# Patient Record
Sex: MALE | Race: WHITE | NOT HISPANIC OR LATINO
[De-identification: names, ages, dates, MRNs, and addresses within clinical notes are randomized per-mention and may not be internally consistent; named-entity substitution may affect disease eponyms.]

---

## 2019-01-01 ENCOUNTER — APPOINTMENT (OUTPATIENT)
Dept: CARDIOLOGY | Facility: CLINIC | Age: 69
End: 2019-01-01
Payer: MEDICARE

## 2019-01-01 ENCOUNTER — NON-APPOINTMENT (OUTPATIENT)
Age: 69
End: 2019-01-01

## 2019-01-01 VITALS
DIASTOLIC BLOOD PRESSURE: 68 MMHG | HEART RATE: 110 BPM | SYSTOLIC BLOOD PRESSURE: 124 MMHG | OXYGEN SATURATION: 98 % | RESPIRATION RATE: 16 BRPM

## 2019-01-01 VITALS — SYSTOLIC BLOOD PRESSURE: 128 MMHG | DIASTOLIC BLOOD PRESSURE: 70 MMHG

## 2019-01-01 VITALS — WEIGHT: 189 LBS | HEIGHT: 69 IN | BODY MASS INDEX: 27.99 KG/M2

## 2019-01-01 DIAGNOSIS — E31.20 MULTIPLE ENDOCRINE NEOPLASIA [MEN] SYNDROME, UNSPECIFIED: ICD-10-CM

## 2019-01-01 DIAGNOSIS — Z83.3 FAMILY HISTORY OF DIABETES MELLITUS: ICD-10-CM

## 2019-01-01 DIAGNOSIS — Z86.39 PERSONAL HISTORY OF OTHER ENDOCRINE, NUTRITIONAL AND METABOLIC DISEASE: ICD-10-CM

## 2019-01-01 DIAGNOSIS — Z01.810 ENCOUNTER FOR PREPROCEDURAL CARDIOVASCULAR EXAMINATION: ICD-10-CM

## 2019-01-01 DIAGNOSIS — Z78.9 OTHER SPECIFIED HEALTH STATUS: ICD-10-CM

## 2019-01-01 DIAGNOSIS — Z87.438 PERSONAL HISTORY OF OTHER DISEASES OF MALE GENITAL ORGANS: ICD-10-CM

## 2019-01-01 DIAGNOSIS — Z87.442 PERSONAL HISTORY OF URINARY CALCULI: ICD-10-CM

## 2019-01-01 DIAGNOSIS — Z98.890 OTHER SPECIFIED POSTPROCEDURAL STATES: ICD-10-CM

## 2019-01-01 DIAGNOSIS — K59.00 CONSTIPATION, UNSPECIFIED: ICD-10-CM

## 2019-01-01 DIAGNOSIS — N42.9 DISORDER OF PROSTATE, UNSPECIFIED: ICD-10-CM

## 2019-01-01 PROCEDURE — 99205 OFFICE O/P NEW HI 60 MIN: CPT | Mod: 25

## 2019-01-01 PROCEDURE — 93000 ELECTROCARDIOGRAM COMPLETE: CPT

## 2019-01-01 PROCEDURE — 93306 TTE W/DOPPLER COMPLETE: CPT

## 2019-01-01 RX ORDER — DOCUSATE SODIUM 100 MG/1
100 CAPSULE, LIQUID FILLED ORAL
Refills: 0 | Status: ACTIVE | COMMUNITY

## 2019-01-01 RX ORDER — TAMSULOSIN HYDROCHLORIDE 0.4 MG/1
0.4 CAPSULE ORAL
Refills: 0 | Status: ACTIVE | COMMUNITY

## 2019-01-01 RX ORDER — UBIDECARENONE/VIT E ACET 100MG-5
1000 CAPSULE ORAL
Refills: 0 | Status: ACTIVE | COMMUNITY

## 2019-11-13 PROBLEM — K59.00 CONSTIPATION: Status: ACTIVE | Noted: 2019-01-01

## 2019-11-13 PROBLEM — E31.20: Status: RESOLVED | Noted: 2019-01-01 | Resolved: 2019-01-01

## 2019-11-13 PROBLEM — Z78.9 SOCIAL ALCOHOL USE: Status: ACTIVE | Noted: 2019-01-01

## 2019-11-13 PROBLEM — Z83.3 FAMILY HISTORY OF DIABETES MELLITUS: Status: ACTIVE | Noted: 2019-01-01

## 2019-11-13 PROBLEM — Z78.9 CAFFEINE USE: Status: ACTIVE | Noted: 2019-01-01

## 2019-11-13 PROBLEM — Z87.438 HISTORY OF BENIGN PROSTATIC HYPERPLASIA: Status: RESOLVED | Noted: 2019-01-01 | Resolved: 2019-01-01

## 2019-11-13 PROBLEM — Z78.9 EXERCISES RARELY: Status: ACTIVE | Noted: 2019-01-01

## 2019-11-13 PROBLEM — Z98.890 H/O COLONOSCOPY: Status: RESOLVED | Noted: 2019-01-01 | Resolved: 2019-01-01

## 2019-11-13 PROBLEM — N42.9 PROSTATE DISEASE: Status: ACTIVE | Noted: 2019-01-01

## 2019-11-13 PROBLEM — Z87.442 HISTORY OF KIDNEY STONES: Status: RESOLVED | Noted: 2019-01-01 | Resolved: 2019-01-01

## 2019-11-13 PROBLEM — Z01.810 PREOPERATIVE CARDIOVASCULAR EXAMINATION: Status: ACTIVE | Noted: 2019-01-01

## 2019-11-13 PROBLEM — Z86.39 HISTORY OF HYPERLIPIDEMIA: Status: RESOLVED | Noted: 2019-01-01 | Resolved: 2019-01-01

## 2019-11-13 NOTE — REASON FOR VISIT
[Initial Evaluation] : an initial evaluation of [FreeTextEntry2] : rigth atrial flutter , and s/p ablation [FreeTextEntry1] : rigth atrial flutter , and s/p ablation

## 2019-11-13 NOTE — HISTORY OF PRESENT ILLNESS
[FreeTextEntry1] : rigth atrial flutter , and s/p ablation\par \par this is a 68 year old male with history of atrial flutter, s/p ablation 2014, here for follow up;. \par \par oct 24th , 2019, went to [facility] with abomdinal pain. they did a CT scan,. The CT scan showed 2 Adrenal masses bilaterally and kidney stones. liver nodules. Ureteral calculus. \par Adrenal mases increased in size compared to aprl 2019 \par \par \par  [Scheduled Procedure ___] : a [unfilled] [Preoperative Visit] : for a medical evaluation prior to surgery [Date of Surgery ___] : on [unfilled] [Surgeon Name ___] : surgeon: [unfilled] [Fever] : no fever [Chills] : no chills [Fatigue] : no fatigue [Chest Pain] : no chest pain [Cough] : no cough [Dyspnea] : no dyspnea [Dysuria] : no dysuria [Nausea] : no nausea [Urinary Frequency] : no urinary frequency [Diarrhea] : no diarrhea [Abdominal Pain] : abdominal pain [Vomiting] : no vomiting [Lower Extremity Swelling] : no lower extremity swelling [Easy Bruising] : no easy bruising [Poor Exercise Tolerance] : no poor exercise tolerance [Diabetes] : no diabetes [Cardiovascular Disease] : no cardiovascular disease [Nicotine Dependence] : no nicotine dependence [Anti-Platelet Agents] : no anti-platelet agents [Pulmonary Disease] : no pulmonary disease [Alcohol Use] : no  alcohol use [Renal Disease] : no renal disease [Thromboembolic Problems] : no thromboembolic problems [GI Disease] : no gastrointestinal disease [Sleep Apnea] : no sleep apnea [Transfusion Reaction] : no transfusion reaction [Steroid Use in Last 6 Months] : no steroid use in the last six months [Impaired Immunity] : no impaired immunity [Frequent Aspirin Use] : no frequent aspirin use [Prior Anesthesia] : Prior anesthesia [Prev Anesthesia Reaction] : no previous anesthesia reaction [Electrocardiogram] : ~T an ECG ~C was performed [Metabolic Capacity ___Mets%] : The patient has a metabolic capacity of [unfilled] Mets%  [Sudden Death] : no sudden death [Good] : Good [Anesthesia Reaction] : no anesthesia reaction [Bleeding Disorder] : no bleeding disorder [Clotting Disorder] : no clotting disorder [de-identified] : periumbilical pain , bloating.  [de-identified] : [facility]

## 2019-11-13 NOTE — PHYSICAL EXAM
[General Appearance - Well Developed] : well developed [Normal Appearance] : normal appearance [Well Groomed] : well groomed [General Appearance - Well Nourished] : well nourished [No Deformities] : no deformities [General Appearance - In No Acute Distress] : no acute distress [Normal Conjunctiva] : the conjunctiva exhibited no abnormalities [Eyelids - No Xanthelasma] : the eyelids demonstrated no xanthelasmas [No Oral Pallor] : no oral pallor [Normal Oral Mucosa] : normal oral mucosa [Normal Jugular Venous A Waves Present] : normal jugular venous A waves present [No Oral Cyanosis] : no oral cyanosis [No Jugular Venous Roldan A Waves] : no jugular venous roldan A waves [Heart Rate And Rhythm] : heart rate and rhythm were normal [Normal Jugular Venous V Waves Present] : normal jugular venous V waves present [Heart Sounds] : normal S1 and S2 [Murmurs] : no murmurs present [Respiration, Rhythm And Depth] : normal respiratory rhythm and effort [Auscultation Breath Sounds / Voice Sounds] : lungs were clear to auscultation bilaterally [Exaggerated Use Of Accessory Muscles For Inspiration] : no accessory muscle use [Abdomen Soft] : soft [Abdomen Tenderness] : non-tender [Abnormal Walk] : normal gait [Abdomen Mass (___ Cm)] : no abdominal mass palpated [Gait - Sufficient For Exercise Testing] : the gait was sufficient for exercise testing [Cyanosis, Localized] : no localized cyanosis [Nail Clubbing] : no clubbing of the fingernails [Petechial Hemorrhages (___cm)] : no petechial hemorrhages [Skin Color & Pigmentation] : normal skin color and pigmentation [] : no rash [No Venous Stasis] : no venous stasis [Skin Lesions] : no skin lesions [No Xanthoma] : no  xanthoma was observed [No Skin Ulcers] : no skin ulcer [Oriented To Time, Place, And Person] : oriented to person, place, and time [Affect] : the affect was normal [Mood] : the mood was normal [No Anxiety] : not feeling anxious

## 2019-11-13 NOTE — DISCUSSION/SUMMARY
[Patient] : the patient [Benefits] : benefits [Risks] : risks [Alternatives] : alternatives [With Me] : with me [___ Month(s)] : [unfilled] month(s) [FreeTextEntry1] : This is a 68 year old male with history of .atrial flutter, s/p ablation, here with Pre-operative cardiovascular risk evaluation and management \par 1)  Pre-operative cardiovascular risk evaluation and management : No cardiac Symptoms. No diagnostic ischemic changes on ECG. METs > 4.  RCRI score < 1%. Michaels perioperative risk score < 1%. NSQIP score < 1%. No further cardiac work up is needed. Proceed for surgery as indicated.\par Any further work up will not change the risk of this patient. Benefits of surgery outweigh the risk. \par 2) Right atrial enlargement: 2D echo with salien contrast bubble study. '\par 3) History of atrial flutter: s/p ablation./ marcos aspirin for surgery.  [Procedure Low Risk] : the procedure risk is low [As per surgery] : as per surgery [Optimized for Surgery] : the patient is optimized for surgery [FreeTextEntry3] : hold aspirin for 5 days, and resumt 2-3 days after procedure.  [Continue] : Continue medications as currently directed

## 2020-01-01 ENCOUNTER — TRANSCRIPTION ENCOUNTER (OUTPATIENT)
Age: 70
End: 2020-01-01

## 2020-01-01 ENCOUNTER — APPOINTMENT (OUTPATIENT)
Dept: CARDIOLOGY | Facility: CLINIC | Age: 70
End: 2020-01-01
Payer: MEDICARE

## 2020-01-01 ENCOUNTER — NON-APPOINTMENT (OUTPATIENT)
Age: 70
End: 2020-01-01

## 2020-01-01 VITALS
DIASTOLIC BLOOD PRESSURE: 62 MMHG | WEIGHT: 188 LBS | SYSTOLIC BLOOD PRESSURE: 105 MMHG | HEART RATE: 78 BPM | BODY MASS INDEX: 27.85 KG/M2 | HEIGHT: 69 IN | OXYGEN SATURATION: 95 %

## 2020-01-01 DIAGNOSIS — I48.0 PAROXYSMAL ATRIAL FIBRILLATION: ICD-10-CM

## 2020-01-01 DIAGNOSIS — R00.2 PALPITATIONS: ICD-10-CM

## 2020-01-01 DIAGNOSIS — R52 PAIN, UNSPECIFIED: ICD-10-CM

## 2020-01-01 DIAGNOSIS — E78.00 PURE HYPERCHOLESTEROLEMIA, UNSPECIFIED: ICD-10-CM

## 2020-01-01 DIAGNOSIS — Z09 ENCOUNTER FOR FOLLOW-UP EXAMINATION AFTER COMPLETED TREATMENT FOR CONDITIONS OTHER THAN MALIGNANT NEOPLASM: ICD-10-CM

## 2020-01-01 DIAGNOSIS — Z86.79 PERSONAL HISTORY OF OTHER DISEASES OF THE CIRCULATORY SYSTEM: ICD-10-CM

## 2020-01-01 PROCEDURE — 99214 OFFICE O/P EST MOD 30 MIN: CPT | Mod: 25

## 2020-01-01 PROCEDURE — 93000 ELECTROCARDIOGRAM COMPLETE: CPT

## 2020-01-01 PROCEDURE — 99215 OFFICE O/P EST HI 40 MIN: CPT | Mod: 95

## 2020-01-01 RX ORDER — SULFAMETHOXAZOLE AND TRIMETHOPRIM 800; 160 MG/1; MG/1
800-160 TABLET ORAL
Refills: 0 | Status: ACTIVE | COMMUNITY
Start: 2020-01-01

## 2020-01-01 RX ORDER — METOPROLOL SUCCINATE 25 MG/1
25 TABLET, EXTENDED RELEASE ORAL DAILY
Qty: 90 | Refills: 1 | Status: ACTIVE | COMMUNITY
Start: 2020-01-01 | End: 1900-01-01

## 2020-01-01 RX ORDER — ACYCLOVIR 200 MG/1
200 CAPSULE ORAL TWICE DAILY
Refills: 0 | Status: DISCONTINUED | COMMUNITY
Start: 2020-01-01 | End: 2020-01-01

## 2020-01-01 RX ORDER — PANTOPRAZOLE SODIUM 40 MG/1
40 TABLET, DELAYED RELEASE ORAL DAILY
Refills: 0 | Status: ACTIVE | COMMUNITY
Start: 2020-01-01

## 2020-01-01 RX ORDER — DILTIAZEM HYDROCHLORIDE 30 MG/1
30 TABLET ORAL 3 TIMES DAILY
Refills: 0 | Status: DISCONTINUED | COMMUNITY
End: 2020-01-01

## 2020-01-01 RX ORDER — OXYCODONE HYDROCHLORIDE 5 MG/1
5 CAPSULE ORAL
Refills: 0 | Status: ACTIVE | COMMUNITY

## 2020-01-01 RX ORDER — ACYCLOVIR 400 MG/1
400 TABLET ORAL TWICE DAILY
Refills: 0 | Status: ACTIVE | COMMUNITY

## 2020-01-01 RX ORDER — DILTIAZEM HYDROCHLORIDE 30 MG/1
30 TABLET ORAL EVERY 6 HOURS
Qty: 360 | Refills: 1 | Status: DISCONTINUED | COMMUNITY
End: 2020-01-01

## 2020-01-01 RX ORDER — SENNOSIDES 8.6 MG TABLETS 8.6 MG/1
8.6 TABLET ORAL DAILY
Refills: 0 | Status: ACTIVE | COMMUNITY

## 2020-01-01 RX ORDER — ATORVASTATIN CALCIUM 20 MG/1
20 TABLET, FILM COATED ORAL DAILY
Refills: 0 | Status: DISCONTINUED | COMMUNITY
End: 2020-01-01

## 2020-01-01 RX ORDER — FAMOTIDINE 20 MG/1
20 TABLET, FILM COATED ORAL
Refills: 0 | Status: ACTIVE | COMMUNITY

## 2020-01-24 PROBLEM — Z09 HOSPITAL DISCHARGE FOLLOW-UP: Status: ACTIVE | Noted: 2020-01-01

## 2020-01-24 NOTE — PHYSICAL EXAM
[Well Groomed] : well groomed [General Appearance - Well Nourished] : well nourished [No Deformities] : no deformities [General Appearance - In No Acute Distress] : no acute distress [Normal Conjunctiva] : the conjunctiva exhibited no abnormalities [Normal Oral Mucosa] : normal oral mucosa [Normal Jugular Venous V Waves Present] : normal jugular venous V waves present [Respiration, Rhythm And Depth] : normal respiratory rhythm and effort [Auscultation Breath Sounds / Voice Sounds] : lungs were clear to auscultation bilaterally [Heart Sounds] : normal S1 and S2 [Edema] : no peripheral edema present [Veins - Varicosity Changes] : no varicosital changes were noted in the lower extremities [Abdomen Soft] : soft [Skin Color & Pigmentation] : normal skin color and pigmentation [] : no rash [Oriented To Time, Place, And Person] : oriented to person, place, and time [No Anxiety] : not feeling anxious

## 2020-01-24 NOTE — PHYSICAL EXAM
[Well Groomed] : well groomed [General Appearance - Well Nourished] : well nourished [No Deformities] : no deformities [Normal Conjunctiva] : the conjunctiva exhibited no abnormalities [General Appearance - In No Acute Distress] : no acute distress [Normal Oral Mucosa] : normal oral mucosa [Normal Jugular Venous V Waves Present] : normal jugular venous V waves present [Respiration, Rhythm And Depth] : normal respiratory rhythm and effort [Auscultation Breath Sounds / Voice Sounds] : lungs were clear to auscultation bilaterally [Heart Sounds] : normal S1 and S2 [Edema] : no peripheral edema present [Veins - Varicosity Changes] : no varicosital changes were noted in the lower extremities [Abdomen Soft] : soft [Skin Color & Pigmentation] : normal skin color and pigmentation [] : no rash [Oriented To Time, Place, And Person] : oriented to person, place, and time [No Anxiety] : not feeling anxious

## 2020-01-24 NOTE — REASON FOR VISIT
[Follow-Up - From Hospitalization] : follow-up of a recent hospitalization for [Atrial Fibrillation] : atrial fibrillation [Discharge Date: ___] : Discharge Date: [unfilled] [Spouse] : spouse

## 2020-01-29 NOTE — HISTORY OF PRESENT ILLNESS
[FreeTextEntry1] : this is a 68 year old male with history of atrial flutter, s/p ablation 2014, here for follow up of hospital discharge follow up;. \par \par oct 24th , 2019, went to [facility] with abomdinal pain. they did a CT scan,. The CT scan showed 2 Adrenal masses bilaterally and kidney stones. liver nodules. Ureteral calculus. \par Adrenal mases increased in size compared to aprl 2019\par \par 1/24/2020\par Mr. Godfrey reports for hospital discharge follow up visit. He was in [facility] from 1/4-1/92020 with c/o high fever, + for flu A, and rapid  AFib heart rate up to 190', converted to SR after IV Cardizem. Today SR on EKG, CHA2DS- 2 VASc SCORE of 1 (age), he feels ok, denies any chest pain, SOB, palpitations, syncope or near syncope. on chemotherapy. Echo cardiogram was done at [facility].  \par \par  \par

## 2020-01-29 NOTE — REVIEW OF SYSTEMS
[Cough] : cough [Negative] : Genitourinary [Shortness Of Breath] : no shortness of breath [Chest Pain] : no chest pain [Palpitations] : no palpitations [Wheezing] : no wheezing [Abdominal Pain] : no abdominal pain [Heartburn] : no heartburn [Dysphagia] : no dysphagia [Confusion] : no confusion was observed [Anxiety] : no anxiety [Excessive Thirst] : no polydipsia [Easy Bleeding] : no tendency for easy bleeding [Easy Bruising] : no tendency for easy bruising

## 2020-01-29 NOTE — DISCUSSION/SUMMARY
[Risks] : risks [Patient] : the patient [Benefits] : benefits [Alternatives] : alternatives [FreeTextEntry1] : this is a 68 year old male with history of atrial flutter, s/p ablation 2014, here for follow up of hospital discharge follow up;. \par Mr. Godfrey reports for hospital discharge follow up visit. He was in [facility] from 1/4-1/9/2020 with c/o high fever, + for flu A, and rapid  AFib heart rate up to 190', converted to SR after IV Cardizem. Today SR on EKG, CHA2DS- 2 VASc SCORE of 1 (age), he feels ok, denies any chest pain, SOB, palpitations, syncope or near syncope. On chemotherapy. Echo cardiogram was done at [facility].  \par \par  Plan:\par 1. Afib- SR on ekg today, no c/o palpitations, cont. ASA 81 mg and diltiazem HCL 30 mg every 6 hours. ordered MCOT for 30 days to assess recurrence of Afib.  \par Recent echo from 1/7/2020 with EF of 55-60%. \par 2. HLD- goal on lipitor 20 mg, low cholesterol diet and exercise. \par 3. f/u in 5/2020 with Dr. Chambers or sooner if needed. \par \par \par SANAZ Ballesteros.

## 2020-01-29 NOTE — DISCUSSION/SUMMARY
[Risks] : risks [Patient] : the patient [Benefits] : benefits [Alternatives] : alternatives [FreeTextEntry1] : this is a 68 year old male with history of atrial flutter, s/p ablation 2014, here for follow up of hospital discharge follow up;. \par Mr. Godfrey reports for hospital discharge follow up visit. He was in [facility] from 1/4-1/9/2020 with c/o high fever, + for flu A, and rapid  AFib heart rate up to 190', converted to SR after IV Cardizem. Today SR on EKG, CHA2DS- 2 VASc SCORE of 1 (age), he feels ok, denies any chest pain, SOB, palpitations, syncope or near syncope. On chemotherapy. Echo cardiogram was done at [facility].  \par \par  Plan:\par 1. Afib- SR on ekg today, no c/o palpitations, cont. ASA 81 mg and diltiazem HCL 30 mg every 6 hours. ordered MCOT for 30 days to assess recurrence of Afib.  \par Recent echo from 1/7/2020 with EF of 55-60%. \par 2. HLD- goal on lipitor 20 mg, low cholesterol diet and exercise. \par 3. f/u in 5/2020 with Dr. Chambers or sooner if needed. \par \par \par SANAZ Ballesteros.

## 2020-04-07 NOTE — CARDIOLOGY SUMMARY
[___] : [unfilled] [LVEF ___%] : LVEF [unfilled]% [Normal] : normal LA size [None] : no mitral regurgitation [de-identified] : Event monitor:  sinus tachycardia. nmo significant events.

## 2020-04-07 NOTE — DISCUSSION/SUMMARY
[Patient] : the patient [Risks] : risks [Benefits] : benefits [Alternatives] : alternatives [___ Month(s)] : [unfilled] month(s) [With Me] : with me [FreeTextEntry1] : This is a 68 year old male with history of .atrial flutter, s/p ablation, here with Pre-operative cardiovascular risk evaluation and management \par 1) elevated troponins:  recent echo is normal. Probably underlying coronary artery disease .  and low Hemoglobin. no chest pain. aspirin . lipitor 20. will discuss about \par 2) dyspnea on exertion : probably underlying coronary artery disease . also anemia. \par 3) History of atrial flutter: s/p ablation./ marcos aspirin for surgery.  no anticoagulation for now.  recent event monitor negative. repeat monitor in 6 mths. \par 4) neuropathy:  discuss with oncology about Pydridoxine.\par 5) Anemia: diuss with oncology about ioron profile. folate and b 12. and Stool occult.  Probabyl chemotherapy induced anemia. \par 6) COVID -19 prevention: isolation. discuss with oncology about high dose vit C prophylactically. (to make sure it does not interferte with his chemotherapy.

## 2020-04-07 NOTE — PHYSICAL EXAM
[General Appearance - Well Developed] : well developed [Normal Appearance] : normal appearance [Well Groomed] : well groomed [General Appearance - Well Nourished] : well nourished [No Deformities] : no deformities [General Appearance - In No Acute Distress] : no acute distress [Oriented To Time, Place, And Person] : oriented to person, place, and time [Affect] : the affect was normal [Mood] : the mood was normal [No Anxiety] : not feeling anxious

## 2020-04-07 NOTE — HISTORY OF PRESENT ILLNESS
[Home] : at home, [unfilled] , at the time of the visit. [Medical Office: ([facility])___] : at ~his/her~ medical office located in [place] [Patient] : the patient [Self] : self [FreeTextEntry1] : right atrial flutter , and s/p ablation\par  Time Initiated 10:15 am  \par HPI for today: : no chest pain. no dyspnea.   no dizizness. no syncope.  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 \par patient was admitted with epigastric pain.  was admitted in hospital. \par serial troponins were elevated.  also his hemoglobin was low to 8.3.  ( a drop from 9.1). \par 4 rounds of Rice and rituximab, \par \par \par old note: this is a 68 year old male with history of atrial flutter, s/p ablation 2014, here for follow up;. \par \par oct 24th , 2019, went to [facility] with abomdinal pain. they did a CT scan,. The CT scan showed 2 Adrenal masses bilaterally and kidney stones. liver nodules. Ureteral calculus. \par Adrenal mases increased in size compared to aprl 2019 \par \par \par  [FreeTextEntry2] : Jc Godfrey [FreeTextEntry4] : ALBERTO Licea

## 2020-05-12 PROBLEM — R52 PAIN: Status: ACTIVE | Noted: 2020-01-01

## 2020-05-12 PROBLEM — Z86.79 H/O ATRIAL FLUTTER: Status: ACTIVE | Noted: 2020-01-01

## 2020-05-12 PROBLEM — I48.0 PAROXYSMAL ATRIAL FIBRILLATION: Status: ACTIVE | Noted: 2020-01-01

## 2020-05-12 NOTE — PHYSICAL EXAM
[General Appearance - Well Developed] : well developed [Well Groomed] : well groomed [General Appearance - Well Nourished] : well nourished [Normal Appearance] : normal appearance [General Appearance - In No Acute Distress] : no acute distress [Oriented To Time, Place, And Person] : oriented to person, place, and time [No Deformities] : no deformities [Affect] : the affect was normal [Mood] : the mood was normal [No Anxiety] : not feeling anxious

## 2020-05-13 NOTE — HISTORY OF PRESENT ILLNESS
[Home] : at home, [unfilled] , at the time of the visit. [Medical Office: ([facility])___] : at the medical office located in  [Patient] : the patient [Self] : self [FreeTextEntry1] : right atrial flutter , and s/p ablation\par \par HPI for today: : Time Initiated 2:25 pm \par he has been feeling fine.  \par they told him to take to take cardizem,   he had CT angio done to evaluate for CT angio to evaluate for PE> PE was negative.  No aortic athersclerosis,. No coronary artery calcifications.   \par \par \par old note:  Time Initiated 10:15 am  \par old note: : no chest pain. no dyspnea.   no dizizness. no syncope.  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 \par patient was admitted with epigastric pain.  was admitted in hospital. \par serial troponins were elevated.  also his hemoglobin was low to 8.3.  ( a drop from 9.1). \par 4 rounds of Rice and rituximab, \par \par \par old note: this is a 68 year old male with history of atrial flutter, s/p ablation 2014, here for follow up;. \par \par oct 24th , 2019, went to [facility] with abomdinal pain. they did a CT scan,. The CT scan showed 2 Adrenal masses bilaterally and kidney stones. liver nodules. Ureteral calculus. \par Adrenal mases increased in size compared to aprl 2019 \par \par \par  [FreeTextEntry2] : Jc Godfrey [FreeTextEntry4] : ALBERTO Licea

## 2020-05-13 NOTE — CARDIOLOGY SUMMARY
[___] : [unfilled] [LVEF ___%] : LVEF [unfilled]% [Normal] : normal LA size [None] : no mitral regurgitation [de-identified] : Event monitor:  sinus tachycardia. nmo significant events.

## 2020-05-13 NOTE — DISCUSSION/SUMMARY
[Risks] : risks [Patient] : the patient [Alternatives] : alternatives [Benefits] : benefits [With Me] : with me [___ Month(s)] : [unfilled] month(s) [FreeTextEntry1] : This is a 68 year old male with history of .atrial flutter, s/p ablation, here with Pre-operative cardiovascular risk evaluation and management \par 1) hypotension: CT angio negative for PE. d.c cardizem. No mention of coronary artery calcifications on CT angio chest. \par 2)tachycardia: toprol xl 25 mg daily.  hydration. \par 3)  elevated troponin:  recent echo is normal. Probably underlying coronary artery disease .   last hb: 8.3  no chest pain. aspirin . lipitor 20.  \par 4) dyspnea on exertion : probably underlying coronary artery disease . also anemia.  \par 5) History of atrial flutter: s/p ablation.  hold aspirin for surgery.  no anticoagulation for now.   d/c caridzem. Toprol 25 mg daily. . repeat monitor in 6 mths. \par 6) neuropathy:  discuss with oncology about Pydridoxine.\par 7) Anemia:

## 2020-05-13 NOTE — REASON FOR VISIT
[Initial Evaluation] : an initial evaluation of [FreeTextEntry1] : rigth atrial flutter , and s/p ablation [FreeTextEntry2] : rigth atrial flutter , and s/p ablation

## 2020-07-14 ENCOUNTER — APPOINTMENT (OUTPATIENT)
Dept: CARDIOLOGY | Facility: CLINIC | Age: 70
End: 2020-07-14

## 2022-09-15 NOTE — HISTORY OF PRESENT ILLNESS
[FreeTextEntry1] : this is a 68 year old male with history of atrial flutter, s/p ablation 2014, here for follow up of hospital discharge follow up;. \par \par oct 24th , 2019, went to [facility] with abomdinal pain. they did a CT scan,. The CT scan showed 2 Adrenal masses bilaterally and kidney stones. liver nodules. Ureteral calculus. \par Adrenal mases increased in size compared to aprl 2019\par \par 1/24/2020\par Mr. Godfrey reports for hospital discharge follow up visit. He was in [facility] from 1/4-1/92020 with c/o high fever, + for flu A, and rapid  AFib heart rate up to 190', converted to SR after IV Cardizem. Today SR on EKG, CHA2DS- 2 VASc SCORE of 1 (age), he feels ok, denies any chest pain, SOB, palpitations, syncope or near syncope. on chemotherapy. [place] [facility]  \par \par  \par  - - -